# Patient Record
Sex: MALE | Race: WHITE | Employment: STUDENT | URBAN - METROPOLITAN AREA
[De-identification: names, ages, dates, MRNs, and addresses within clinical notes are randomized per-mention and may not be internally consistent; named-entity substitution may affect disease eponyms.]

---

## 2017-03-15 ENCOUNTER — OFFICE VISIT (OUTPATIENT)
Dept: INTERNAL MEDICINE CLINIC | Facility: CLINIC | Age: 22
End: 2017-03-15

## 2017-03-15 VITALS
TEMPERATURE: 97 F | OXYGEN SATURATION: 97 % | BODY MASS INDEX: 29.7 KG/M2 | WEIGHT: 196 LBS | SYSTOLIC BLOOD PRESSURE: 110 MMHG | HEART RATE: 68 BPM | DIASTOLIC BLOOD PRESSURE: 70 MMHG | RESPIRATION RATE: 16 BRPM | HEIGHT: 68 IN

## 2017-03-15 DIAGNOSIS — R82.90 URINE ABNORMALITY: Primary | ICD-10-CM

## 2017-03-15 PROCEDURE — 87086 URINE CULTURE/COLONY COUNT: CPT | Performed by: STUDENT IN AN ORGANIZED HEALTH CARE EDUCATION/TRAINING PROGRAM

## 2017-03-15 PROCEDURE — 99203 OFFICE O/P NEW LOW 30 MIN: CPT | Performed by: STUDENT IN AN ORGANIZED HEALTH CARE EDUCATION/TRAINING PROGRAM

## 2017-03-15 NOTE — PROGRESS NOTES
[facility]    REASON FOR VISIT:    Current Complaints: Patient presents with:  Urinary: Bubbly,       HPI:  Tor Afia. is a 25year old male who presents for the evaluation of some urinary symptoms. He is a new patient to the office.   P as noted   Head: Normocephalic and atraumatic. Nose: Nose normal.   Eyes: EOM are normal. Pupils are equal, round, and reactive to light. No scleral icterus. Neck: Normal range of motion. No thyromegaly present.    Cardiovascular: Normal rate, regular r

## 2017-04-10 ENCOUNTER — OFFICE VISIT (OUTPATIENT)
Dept: INTERNAL MEDICINE CLINIC | Facility: CLINIC | Age: 22
End: 2017-04-10

## 2017-04-10 VITALS
HEART RATE: 74 BPM | SYSTOLIC BLOOD PRESSURE: 120 MMHG | RESPIRATION RATE: 16 BRPM | BODY MASS INDEX: 29.86 KG/M2 | HEIGHT: 68 IN | DIASTOLIC BLOOD PRESSURE: 80 MMHG | WEIGHT: 197 LBS | OXYGEN SATURATION: 98 % | TEMPERATURE: 99 F

## 2017-04-10 DIAGNOSIS — K60.4 PERIRECTAL FISTULA: Primary | ICD-10-CM

## 2017-04-10 PROCEDURE — 99213 OFFICE O/P EST LOW 20 MIN: CPT | Performed by: INTERNAL MEDICINE

## 2017-04-11 NOTE — PROGRESS NOTES
HPI:    Patient ID: Summer Hernández. is a 25year old male. Abscess  This is a recurrent problem. Episode onset: over last few years. The problem occurs intermittently. The problem has been waxing and waning.  Pertinent negatives include no abdominal

## 2017-04-18 ENCOUNTER — OFFICE VISIT (OUTPATIENT)
Dept: SURGERY | Facility: CLINIC | Age: 22
End: 2017-04-18

## 2017-04-18 VITALS
DIASTOLIC BLOOD PRESSURE: 75 MMHG | HEIGHT: 68 IN | HEART RATE: 62 BPM | TEMPERATURE: 98 F | WEIGHT: 197.38 LBS | BODY MASS INDEX: 29.91 KG/M2 | SYSTOLIC BLOOD PRESSURE: 127 MMHG | OXYGEN SATURATION: 99 % | RESPIRATION RATE: 16 BRPM

## 2017-04-18 DIAGNOSIS — L05.91 PILONIDAL CYST: Primary | ICD-10-CM

## 2017-04-18 PROCEDURE — 46600 DIAGNOSTIC ANOSCOPY SPX: CPT | Performed by: SURGERY

## 2017-04-18 PROCEDURE — 99243 OFF/OP CNSLTJ NEW/EST LOW 30: CPT | Performed by: SURGERY

## 2017-04-18 NOTE — H&P
New Patient Visit Note       Active Problems      1.  Pilonidal cyst        Chief Complaint   Patient presents with:  Consult: NP referred by Dr Nhi Junior for fozia rectal abcess       History of Present Illness     The patient presents at the request of his prim Negative for fever, chills, diaphoresis, fatigue and unexpected weight change. HENT: Negative for hearing loss, nosebleeds, sore throat and trouble swallowing. Respiratory: Negative for apnea, cough, shortness of breath and wheezing.     Cardiovascular Posterior Fissure  No Pilonidal Cyst    See Procedures:  Anoscopy reveals normal anal rectum with no fissures or fistula lesions or other mucosal-based abnormalities. Musculoskeletal: Normal range of motion.    Neurological: He is alert and oriented to pe

## 2017-06-02 PROCEDURE — 88304 TISSUE EXAM BY PATHOLOGIST: CPT | Performed by: SURGERY

## 2017-06-08 ENCOUNTER — OFFICE VISIT (OUTPATIENT)
Dept: SURGERY | Facility: CLINIC | Age: 22
End: 2017-06-08

## 2017-06-08 VITALS
DIASTOLIC BLOOD PRESSURE: 74 MMHG | HEIGHT: 68 IN | BODY MASS INDEX: 28.79 KG/M2 | SYSTOLIC BLOOD PRESSURE: 109 MMHG | HEART RATE: 94 BPM | WEIGHT: 190 LBS | TEMPERATURE: 99 F

## 2017-06-08 DIAGNOSIS — L05.91 PILONIDAL CYST: Primary | ICD-10-CM

## 2017-06-08 PROCEDURE — 99024 POSTOP FOLLOW-UP VISIT: CPT | Performed by: PHYSICIAN ASSISTANT

## 2017-06-15 ENCOUNTER — OFFICE VISIT (OUTPATIENT)
Dept: SURGERY | Facility: CLINIC | Age: 22
End: 2017-06-15

## 2017-06-15 VITALS
BODY MASS INDEX: 28.34 KG/M2 | HEIGHT: 68 IN | DIASTOLIC BLOOD PRESSURE: 65 MMHG | RESPIRATION RATE: 14 BRPM | SYSTOLIC BLOOD PRESSURE: 110 MMHG | WEIGHT: 187 LBS | TEMPERATURE: 100 F | HEART RATE: 62 BPM

## 2017-06-15 DIAGNOSIS — L05.91 PILONIDAL CYST: Primary | ICD-10-CM

## 2017-06-15 PROCEDURE — 99024 POSTOP FOLLOW-UP VISIT: CPT | Performed by: SURGERY

## 2017-06-15 NOTE — PROGRESS NOTES
Post Operative Visit Note       Active Problems  1. Pilonidal cyst         Chief Complaint   Patient presents with:  Pilonidal Cyst: 2nd Post op follow pilonidal cyst. Surgery 6/2/2017. c/c unable to lean back slight discomfort.          History of Present I & Dressings (GAUZE DRESSING) 4\"X4\" Does not apply Pads Moisten gauze with saline and place into wound twice daily. Cover with dry gauze and one piece of paper tape.  Disp: 50 each Rfl: 5   HYDROcodone-acetaminophen 7.5-325 MG/15ML Oral Solution Take by mo visit.  ·   · The patient will return to my attention at regular intervals to monitor wound healing progress. ·   · The patient is encouraged to continue seeing the primary care physician for ongoing medical needs.   ·   · The patient was given ample oppor

## 2017-06-21 ENCOUNTER — TELEPHONE (OUTPATIENT)
Dept: SURGERY | Facility: CLINIC | Age: 22
End: 2017-06-21

## 2017-06-21 NOTE — TELEPHONE ENCOUNTER
Pt had pilonidal cystectomy 6/2 , off Norco since 6/15 and now having diarrhea and a little nausea. Pt denies, fever, chills or drainage from wound. Explained that this is probably not related to the surgery and just a stomach virus.  Instructed mom to call

## 2017-06-29 ENCOUNTER — OFFICE VISIT (OUTPATIENT)
Dept: SURGERY | Facility: CLINIC | Age: 22
End: 2017-06-29

## 2017-06-29 VITALS
RESPIRATION RATE: 16 BRPM | WEIGHT: 184 LBS | BODY MASS INDEX: 27.89 KG/M2 | HEART RATE: 61 BPM | HEIGHT: 68 IN | SYSTOLIC BLOOD PRESSURE: 112 MMHG | TEMPERATURE: 98 F | DIASTOLIC BLOOD PRESSURE: 68 MMHG

## 2017-06-29 DIAGNOSIS — L05.91 PILONIDAL CYST: Primary | ICD-10-CM

## 2017-06-29 PROCEDURE — 99024 POSTOP FOLLOW-UP VISIT: CPT | Performed by: SURGERY

## 2017-06-29 NOTE — PROGRESS NOTES
Follow Up Visit Note       Active Problems      1. Pilonidal cyst          Chief Complaint   Patient presents with:  Pilonidal Cyst: s/p PILONIDAL CYSTECTOMY ON 6/2. PT IN FOR FURTHER CARE AND TREATMENT.  PT has questions about hair removal around wound- de Systems  The Review of Systems has been reviewed by me during today. Review of Systems   Constitutional: Negative for chills, diaphoresis, fatigue, fever and unexpected weight change.    HENT: Negative for hearing loss, nosebleeds, sore throat and trouble in detail and to the patient's satisfaction. The patient voiced understanding of the postoperative care plan. ·   ·        No orders of the defined types were placed in this encounter.       Imaging & Referrals   None    Follow Up  Return in about 4 weeks

## 2017-07-06 ENCOUNTER — OFFICE VISIT (OUTPATIENT)
Dept: INTERNAL MEDICINE CLINIC | Facility: CLINIC | Age: 22
End: 2017-07-06

## 2017-07-06 ENCOUNTER — HOSPITAL ENCOUNTER (OUTPATIENT)
Dept: ULTRASOUND IMAGING | Age: 22
Discharge: HOME | End: 2017-07-06
Attending: INTERNAL MEDICINE
Payer: COMMERCIAL

## 2017-07-06 VITALS
OXYGEN SATURATION: 98 % | HEART RATE: 71 BPM | BODY MASS INDEX: 28.19 KG/M2 | SYSTOLIC BLOOD PRESSURE: 110 MMHG | RESPIRATION RATE: 16 BRPM | DIASTOLIC BLOOD PRESSURE: 68 MMHG | HEIGHT: 68 IN | TEMPERATURE: 99 F | WEIGHT: 186 LBS

## 2017-07-06 DIAGNOSIS — R10.31 INGUINAL PAIN, RIGHT: ICD-10-CM

## 2017-07-06 DIAGNOSIS — R30.0 DYSURIA: Primary | ICD-10-CM

## 2017-07-06 LAB
APPEARANCE: CLEAR
BILIRUBIN: NEGATIVE
GLUCOSE (URINE DIPSTICK): NEGATIVE MG/DL
KETONES (URINE DIPSTICK): NEGATIVE MG/DL
LEUKOCYTES: NEGATIVE
MULTISTIX LOT#: NORMAL NUMERIC
NITRITE, URINE: NEGATIVE
OCCULT BLOOD: NEGATIVE
PH, URINE: 7 (ref 4.5–8)
PROTEIN (URINE DIPSTICK): NEGATIVE MG/DL
SPECIFIC GRAVITY: 1.02 (ref 1–1.03)
URINE-COLOR: YELLOW
UROBILINOGEN,SEMI-QN: 0.2 MG/DL (ref 0–1.9)

## 2017-07-06 PROCEDURE — 76770 US EXAM ABDO BACK WALL COMP: CPT | Performed by: INTERNAL MEDICINE

## 2017-07-06 PROCEDURE — 81003 URINALYSIS AUTO W/O SCOPE: CPT | Performed by: INTERNAL MEDICINE

## 2017-07-06 PROCEDURE — 99214 OFFICE O/P EST MOD 30 MIN: CPT | Performed by: INTERNAL MEDICINE

## 2017-07-06 RX ORDER — CIPROFLOXACIN 500 MG/1
500 TABLET, FILM COATED ORAL 2 TIMES DAILY
Qty: 14 TABLET | Refills: 0 | Status: SHIPPED | OUTPATIENT
Start: 2017-07-06 | End: 2017-07-25 | Stop reason: ALTCHOICE

## 2017-07-06 NOTE — PATIENT INSTRUCTIONS
Kidney Stone (with Pain)    The sharp cramping pain on either side of your lower back and nausea/vomiting that you have are because of a small stone that has formed in the kidney. It is now passing down a narrow tube (ureter) on its way to your bladder. · Each time you urinate, do so in a jar. Pour the urine from the jar through the strainer and into the toilet. Continue doing this until 24 hours after your pain stops. By then, if there was a kidney stone, it should pass from your bladder.  Some stones dis · Eat foods that contain phytates. These include wheat, rice, rye, barley, and beans. Phytates are substances that may lower your risk for any type of stone to form. · Eat more fruits and vegetables. Choose those that are high in potassium.   · Eat foods h Follow up with your healthcare provider, or as advised, if the pain lasts more than 48 hours. Talk with your provider about urine and blood tests to find out the cause of your stone.  If you had an X-ray, CT scan, or other diagnostic test, you will be told

## 2017-07-06 NOTE — PROGRESS NOTES
HPI:    Patient ID: Rock Ganser. is a 25year old male. Dysuria    This is a new problem. Episode onset: 1 week. The problem occurs every urination. The problem has been unchanged. The quality of the pain is described as burning.  The pain is at distress. Neck: Neck supple. Cardiovascular: Normal rate, regular rhythm and normal heart sounds. No murmur heard. Pulmonary/Chest: Effort normal and breath sounds normal. No respiratory distress. He has no wheezes. He has no rales.    Abdominal: So

## 2017-07-11 ENCOUNTER — OFFICE VISIT (OUTPATIENT)
Dept: SURGERY | Facility: CLINIC | Age: 22
End: 2017-07-11

## 2017-07-11 VITALS
SYSTOLIC BLOOD PRESSURE: 115 MMHG | HEART RATE: 66 BPM | WEIGHT: 186 LBS | BODY MASS INDEX: 28.19 KG/M2 | DIASTOLIC BLOOD PRESSURE: 67 MMHG | TEMPERATURE: 99 F | HEIGHT: 68 IN

## 2017-07-11 DIAGNOSIS — L05.91 PILONIDAL CYST: Primary | ICD-10-CM

## 2017-07-11 PROCEDURE — 99024 POSTOP FOLLOW-UP VISIT: CPT | Performed by: PHYSICIAN ASSISTANT

## 2017-07-11 NOTE — PROGRESS NOTES
Follow Up Visit Note       Active Problems      1. Pilonidal cyst          Chief Complaint   Patient presents with:  Pilonidal Cyst: S/P PILONIDAL CYSTECTOMY ON 6/2 WITH PBP. C/O discomfort.        History of Present Illness  The patient is a pleasant 22-ye times daily. Disp: 14 tablet Rfl: 0   acetaminophen 500 MG Oral Tab Take 500 mg by mouth every 6 (six) hours as needed for Pain. Disp:  Rfl:    Soft Lens Products (SALINE) 0.9 % Does not apply Solution Moisten gauze and place into wound two times daily.  Patrick Lin Neck: Normal range of motion. Cardiovascular: Normal rate and regular rhythm. Pulmonary/Chest: Effort normal. No respiratory distress. Abdominal: Soft. Bowel sounds are normal. He exhibits no distension. Musculoskeletal: Normal range of motion. defined types were placed in this encounter. Imaging & Referrals   None    Follow Up  Return in 3 weeks (on 8/1/2017) for pilonidal wound check.     TRI Weiss

## 2017-07-25 PROBLEM — L05.91 PILONIDAL CYST: Status: RESOLVED | Noted: 2017-04-18 | Resolved: 2017-07-25

## 2017-07-25 NOTE — PROGRESS NOTES
HPI:    Patient ID: Navya Shipman. is a 25year old male. Anxiety   This is a chronic problem. The current episode started more than 1 year ago. The problem occurs constantly. The problem has been rapidly worsening.  Associated symptoms include fat sounds. No murmur heard. Pulmonary/Chest: Effort normal and breath sounds normal. No respiratory distress. He has no wheezes. He has no rales. Abdominal: Soft. Bowel sounds are normal. He exhibits no distension. There is no tenderness.  There is no re

## 2017-08-01 ENCOUNTER — OFFICE VISIT (OUTPATIENT)
Dept: SURGERY | Facility: CLINIC | Age: 22
End: 2017-08-01

## 2017-08-01 VITALS
WEIGHT: 186 LBS | BODY MASS INDEX: 28.19 KG/M2 | HEART RATE: 62 BPM | SYSTOLIC BLOOD PRESSURE: 116 MMHG | HEIGHT: 68 IN | DIASTOLIC BLOOD PRESSURE: 72 MMHG

## 2017-08-01 DIAGNOSIS — L05.91 PILONIDAL CYST: Primary | ICD-10-CM

## 2017-08-01 PROCEDURE — 99024 POSTOP FOLLOW-UP VISIT: CPT | Performed by: PHYSICIAN ASSISTANT

## 2017-08-01 NOTE — PROGRESS NOTES
Follow Up Visit Note       Active Problems      1. Pilonidal cyst          Chief Complaint   Patient presents with: Follow - Up: est. pt in for 3 week further care and treatment for Pilonidal cystectomy on 6/2 with PBP.        History of Present Illness  T Prescriptions:  escitalopram 10 MG Oral Tab Take 1 tablet (10 mg total) by mouth daily. Disp: 30 tablet Rfl: 0        Review of Systems  The Review of Systems has been reviewed by me during today.   Review of Systems   Constitutional: Negative for chills, d well, small amount of granulation tissue remains exposed in a 2 inch vertical line within the gluteal cleft. Hair around the wound is shaved at today's visit. Psychiatric: He has a normal mood and affect.  His behavior is normal. Judgment and thought cont

## 2017-08-14 PROBLEM — F41.1 GENERALIZED ANXIETY DISORDER: Status: ACTIVE | Noted: 2017-08-14

## 2017-08-14 NOTE — PROGRESS NOTES
Kalen Ericka. is a 25year old male. HPI:   Pt presents for follow up of anxiety    Last month was switched from sertraline to lexapro. Pt reports improvement in anxiety but still prone to feeling anxious, excess worry, and some low mood.  Denies pa (primary encounter diagnosis)     Increase lexapro to 20mg daily. Discussed medication indications, risks, alternatives and side effects. The patient indicates understanding of these issues and agrees to the plan.   The patient is asked to return in 8 wk

## 2017-08-17 ENCOUNTER — OFFICE VISIT (OUTPATIENT)
Dept: SURGERY | Facility: CLINIC | Age: 22
End: 2017-08-17

## 2017-08-17 VITALS
SYSTOLIC BLOOD PRESSURE: 99 MMHG | DIASTOLIC BLOOD PRESSURE: 62 MMHG | TEMPERATURE: 98 F | BODY MASS INDEX: 28.59 KG/M2 | HEIGHT: 68 IN | HEART RATE: 67 BPM | WEIGHT: 188.63 LBS

## 2017-08-17 DIAGNOSIS — L05.91 PILONIDAL CYST: Primary | ICD-10-CM

## 2017-08-17 PROCEDURE — 99024 POSTOP FOLLOW-UP VISIT: CPT | Performed by: PHYSICIAN ASSISTANT

## 2017-08-17 NOTE — PROGRESS NOTES
Follow Up Visit Note       Active Problems      1. Pilonidal cyst          Chief Complaint   Patient presents with:  Pilonidal Cyst: Est. pt in for 3 week further care and treatment for Pilonidal cystectomy on 6/2 with PBP.  Pt denies any bleeding discharge Review of Systems has been reviewed by me during today. Review of Systems   Constitutional: Negative for chills, diaphoresis, fatigue, fever and unexpected weight change. HENT: Negative for hearing loss, nosebleeds, sore throat and trouble swallowing. vitals reviewed. Assessment   Pilonidal cyst  (primary encounter diagnosis)    Plan   The patient continues to do well following pilonidal cystectomy.   At today's office visit I discussed with the patient and his father that there is a very small

## 2017-10-06 NOTE — PROGRESS NOTES
Mario Gray. is a 25year old male. HPI:   Pt following up on anxiety. Feels significant improvement with increase in lexapro from 10 to 20mg daily. Denies  Side effects    C/o lump on left wrist, comes and goes, noticed x 1 month.  Denies trauma, Generalized anxiety disorder  (primary encounter diagnosis)  Ganglion cyst of volar aspect of left wrist  Influenza vaccination declined      -HOLLY well controlled on lexapro cpm  -declines flu shot  -educated on ganglion cyst. Referred to ortho, establis

## 2017-10-06 NOTE — PATIENT INSTRUCTIONS
Continue lexapro daily  If ganglion cyst becomes painful or bothersome, schedule with Dr. Kelsy Elliott

## 2018-01-12 ENCOUNTER — OFFICE VISIT (OUTPATIENT)
Dept: INTERNAL MEDICINE CLINIC | Facility: CLINIC | Age: 23
End: 2018-01-12

## 2018-01-12 VITALS
TEMPERATURE: 98 F | RESPIRATION RATE: 16 BRPM | OXYGEN SATURATION: 96 % | DIASTOLIC BLOOD PRESSURE: 68 MMHG | HEIGHT: 68 IN | BODY MASS INDEX: 27.58 KG/M2 | SYSTOLIC BLOOD PRESSURE: 118 MMHG | HEART RATE: 82 BPM | WEIGHT: 182 LBS

## 2018-01-12 DIAGNOSIS — F41.8 SITUATIONAL ANXIETY: ICD-10-CM

## 2018-01-12 DIAGNOSIS — F41.1 GENERALIZED ANXIETY DISORDER: ICD-10-CM

## 2018-01-12 DIAGNOSIS — J02.0 STREP THROAT: ICD-10-CM

## 2018-01-12 DIAGNOSIS — J02.9 SORE THROAT: Primary | ICD-10-CM

## 2018-01-12 LAB
CONTROL LINE PRESENT WITH A CLEAR BACKGROUND (YES/NO): YES YES/NO
STREP GRP A CUL-SCR: POSITIVE

## 2018-01-12 PROCEDURE — 99214 OFFICE O/P EST MOD 30 MIN: CPT | Performed by: PHYSICIAN ASSISTANT

## 2018-01-12 PROCEDURE — 87880 STREP A ASSAY W/OPTIC: CPT | Performed by: PHYSICIAN ASSISTANT

## 2018-01-12 RX ORDER — ESCITALOPRAM OXALATE 20 MG/1
20 TABLET ORAL DAILY
Qty: 90 TABLET | Refills: 1 | Status: SHIPPED | OUTPATIENT
Start: 2018-01-12 | End: 2018-01-18

## 2018-01-12 RX ORDER — AZITHROMYCIN 250 MG/1
TABLET, FILM COATED ORAL
Qty: 6 TABLET | Refills: 0 | Status: SHIPPED | OUTPATIENT
Start: 2018-01-12 | End: 2018-08-06 | Stop reason: ALTCHOICE

## 2018-01-12 RX ORDER — PROPRANOLOL HYDROCHLORIDE 20 MG/5ML
20 SOLUTION ORAL
Qty: 250 ML | Refills: 0 | Status: SHIPPED | OUTPATIENT
Start: 2018-01-12 | End: 2019-05-15

## 2018-01-12 NOTE — PROGRESS NOTES
Arlen Schumacher. is a 25year old male. HPI:   Pt following up on lexapro for HOLLY. States he's doing well. X 2 wks complains of anxiety around social situations including palpitations, racing heart, excess worry ahead of time.  Worried d/t some group p tonsillar hypertrophy and enlargement +2 b/l without exudates or abscess. Voice normal  NECK: supple,  no thyromegaly.  + tender 2cm lymph node right subtonsillar area that is mobile without surrounding erythema, no other lymph node involvement, parotid gla

## 2018-01-12 NOTE — PATIENT INSTRUCTIONS
Continue lexapro  Take propranolol as needed, 30 minutes prior to event as needed  Monitor the enlarged lymph node and start flonase over-the-counter, 2 sprays to each nostril every night. Drink plenty of fluid.  Call if any worsening symptoms, or if lymph

## 2018-01-18 ENCOUNTER — TELEPHONE (OUTPATIENT)
Dept: INTERNAL MEDICINE CLINIC | Facility: CLINIC | Age: 23
End: 2018-01-18

## 2018-01-18 DIAGNOSIS — F41.1 GENERALIZED ANXIETY DISORDER: ICD-10-CM

## 2018-01-18 RX ORDER — ESCITALOPRAM OXALATE 20 MG/1
20 TABLET ORAL DAILY
Qty: 90 TABLET | Refills: 1 | Status: SHIPPED | OUTPATIENT
Start: 2018-01-18 | End: 2018-08-06

## 2018-01-18 NOTE — TELEPHONE ENCOUNTER
Patient's mother called and stated generic Lexapro cannot be sent to pharmacy, has to go through mail Order. Please send to [facility] as listed.

## 2018-02-26 ENCOUNTER — TELEPHONE (OUTPATIENT)
Dept: INTERNAL MEDICINE CLINIC | Facility: CLINIC | Age: 23
End: 2018-02-26

## 2018-02-26 DIAGNOSIS — J11.1 FLU: Primary | ICD-10-CM

## 2018-02-26 RX ORDER — OSELTAMIVIR PHOSPHATE 75 MG/1
75 CAPSULE ORAL 2 TIMES DAILY
Qty: 10 CAPSULE | Refills: 0 | Status: SHIPPED | OUTPATIENT
Start: 2018-02-26 | End: 2018-08-06 | Stop reason: ALTCHOICE

## 2018-02-26 NOTE — TELEPHONE ENCOUNTER
Per Dr. Bella Colon, patient c/o fevers, muscle aches and headaches. She recommends Tamiflu, rest and follow up should symptoms persist.     Rx sent to pharmacy and mother aware per Dr. Bella Colon.

## 2018-08-06 PROBLEM — L05.91 PILONIDAL CYST: Status: RESOLVED | Noted: 2017-04-18 | Resolved: 2018-08-06

## 2018-08-06 NOTE — PROGRESS NOTES
Negro Pruitt. is a 21year old male. HPI:   Pt here for med check  On lexapro daily for anxiety and propranolol prn. Since last visit states he has felt somewhat fatigued, unsure if this is due to low mood.  Tried switching lexapro to pm dosing angel cough or wheezing  CARDIOVASCULAR: denies chest pain or palpitations, denies leg swelling  GI: denies abdominal pain and denies heartburn.  Denies nausea, vomiting, diarrhea, constipation  NEURO: denies headaches, dizziness, weakness, syncope  PSYCH: denies understanding of these issues and agrees to the plan. The patient is asked to return in Return in about 6 weeks (around 9/17/2018).  Cecilia Harvey

## 2018-08-06 NOTE — PATIENT INSTRUCTIONS
Take naproxen twice daily with meals for back pain. Start stretching as follows. Continue lexapro at night.  Start wellbutrin once daily in the morning

## 2018-09-22 NOTE — PROGRESS NOTES
Cassandra Lopes. is a 21year old male. HPI:   Pt following up on dysthymia, anxiety  Doing well  States he is taking wellbutrin as directed, only consistent x 2 weeks but feels energy and motivation have begun to improve.  Denies side effects  Stable joint swelling or bony tenderness  NEURO: a/ox3, no focal deficits  PSYCH: mood and affect normal    ASSESSMENT AND PLAN:   Generalized anxiety disorder  (primary encounter diagnosis)  Dysthymia    Improved; continue current therapy with wellbutrin and rebecca

## 2018-10-05 DIAGNOSIS — F41.1 GENERALIZED ANXIETY DISORDER: ICD-10-CM

## 2018-10-08 RX ORDER — ESCITALOPRAM OXALATE 20 MG/1
TABLET ORAL
Qty: 90 TABLET | Refills: 0 | OUTPATIENT
Start: 2018-10-08

## 2019-01-04 ENCOUNTER — HOSPITAL ENCOUNTER (OUTPATIENT)
Age: 24
Discharge: HOME | End: 2019-01-04
Payer: COMMERCIAL

## 2019-01-04 VITALS
HEART RATE: 82 BPM | SYSTOLIC BLOOD PRESSURE: 126 MMHG | OXYGEN SATURATION: 100 % | RESPIRATION RATE: 19 BRPM | TEMPERATURE: 98 F | DIASTOLIC BLOOD PRESSURE: 69 MMHG

## 2019-01-04 DIAGNOSIS — J02.0 STREPTOCOCCUS PHARYNGITIS: Primary | ICD-10-CM

## 2019-01-04 DIAGNOSIS — J06.9 UPPER RESPIRATORY TRACT INFECTION, UNSPECIFIED TYPE: ICD-10-CM

## 2019-01-04 LAB — POCT RAPID STREP: POSITIVE

## 2019-01-04 PROCEDURE — 99213 OFFICE O/P EST LOW 20 MIN: CPT

## 2019-01-04 PROCEDURE — 87430 STREP A AG IA: CPT | Performed by: PHYSICIAN ASSISTANT

## 2019-01-04 PROCEDURE — 99204 OFFICE O/P NEW MOD 45 MIN: CPT

## 2019-01-04 RX ORDER — FLUTICASONE PROPIONATE 50 MCG
2 SPRAY, SUSPENSION (ML) NASAL DAILY
Qty: 16 G | Refills: 0 | Status: SHIPPED | OUTPATIENT
Start: 2019-01-04 | End: 2019-02-03

## 2019-01-04 RX ORDER — PREDNISOLONE SODIUM PHOSPHATE 15 MG/5ML
30 SOLUTION ORAL DAILY
Qty: 50 ML | Refills: 0 | Status: SHIPPED | OUTPATIENT
Start: 2019-01-04 | End: 2019-01-09

## 2019-01-04 RX ORDER — AZITHROMYCIN 200 MG/5ML
500 POWDER, FOR SUSPENSION ORAL DAILY
Qty: 65 ML | Refills: 0 | Status: SHIPPED | OUTPATIENT
Start: 2019-01-04 | End: 2019-01-09

## 2019-01-04 NOTE — ED PROVIDER NOTES
Patient Seen in: [address] [facility]    History   Patient presents with:  Cough/URI    Stated Complaint: cough,congestion    HPI    Patient is a 45-year-old male. Patient has been sick with URI type symptoms for nearly 2 weeks.   Symptom moderately injected. There are a few scattered petechiae. Auditory canals are benign. Lung: No distress, RR, no retraction, breath sounds are clear bilaterally. frequent dry cough.   Cardio: Regular rate and rhythm, normal S1-S2, no murmur appreciable  E Nasal route daily.   Qty: 16 g Refills: 0

## 2019-05-15 NOTE — PATIENT INSTRUCTIONS
Have labs drawn, fasting 8-10 hours prior (water before is ok).    Continue lexapro and healthy lifestyle

## 2019-05-15 NOTE — PROGRESS NOTES
Karolineflacokath Pruitt. is a 25year old male. HPI:   Pt here for med check on lexapro. D/c'd wellbutrin since last visit d/t improved depression. Feeling well  Finished school and has new job in [employer], moving next month. No concerns today.        Current Outpat (LEXAPRO) 20 MG Oral Tab 90 tablet 1     Sig: Take 1 tablet (20 mg total) by mouth daily. The patient indicates understanding of these issues and agrees to the plan.   The patient is asked to return in Return in about 6 months (around 11/15/2019) fo

## 2020-03-02 ENCOUNTER — TELEPHONE (OUTPATIENT)
Dept: INTERNAL MEDICINE CLINIC | Facility: CLINIC | Age: 25
End: 2020-03-02

## 2020-03-02 DIAGNOSIS — F41.1 GENERALIZED ANXIETY DISORDER: ICD-10-CM

## 2020-03-02 RX ORDER — ESCITALOPRAM OXALATE 20 MG/1
20 TABLET ORAL DAILY
Qty: 90 TABLET | Refills: 0 | Status: SHIPPED | OUTPATIENT
Start: 2020-03-02 | End: 2020-05-26

## 2020-03-02 NOTE — TELEPHONE ENCOUNTER
Pt's mom called and stated that the pt had moved out of town and they talked to Richelle Peck and she said she would refill his script 1 more time if she gave us the pharmacy, [address], fax #[phone]

## 2020-03-02 NOTE — TELEPHONE ENCOUNTER
Last time medication was refilled 5/2019   Quantity  and number of refills 90 with 1 refill    Last office visit 5/2019   Next office visit:   [place] of state.  Last refill before discontinuation of medication support

## 2020-05-25 DIAGNOSIS — F41.1 GENERALIZED ANXIETY DISORDER: ICD-10-CM

## 2020-05-26 RX ORDER — ESCITALOPRAM OXALATE 20 MG/1
TABLET ORAL
Qty: 30 TABLET | Refills: 0 | Status: SHIPPED | OUTPATIENT
Start: 2020-05-26

## 2020-06-18 DIAGNOSIS — F41.1 GENERALIZED ANXIETY DISORDER: ICD-10-CM

## 2020-06-18 NOTE — TELEPHONE ENCOUNTER
Left to call the office regarding his refill request. Has pt est with another provider? Last time medication was refilled 5/2020  Quantity  and number of refills 30    Last office visit 5/2019   Next office visit: NONE  Pt has moved out of state.  Last refill before discontinuation of medication support

## 2020-06-19 RX ORDER — ESCITALOPRAM OXALATE 20 MG/1
TABLET ORAL
Qty: 30 TABLET | Refills: 0 | OUTPATIENT
Start: 2020-06-19

## (undated) NOTE — Clinical Note
2017    Patient: Francisco Alcala. : 3/8/1995 Visit date: 2017    Dear  Dr. Charlene Vásquez MD,    Thank you for referring Francisco Alcala. to my practice. Please find my assessment and plan below.            Assessment   Pilonidal cyst  (p

## (undated) NOTE — Clinical Note
06/15/2017    Patient: Lester Gracia. : 3/8/1995 Visit date: 6/15/2017    Dear  Dr. Mason Cabrales MD,    Thank you for referring Lester Gracia. to my practice. Please find my assessment and plan below.         Assessment   Pilonidal cyst  (prim

## (undated) NOTE — LETTER
17    Patient: Cassandra Lopes. : 3/8/1995 Visit date: 2017    Dear  Dr. Jose Miguel Page MD,    Thank you for referring Cassandra Lopes. to my practice. Please find my assessment and plan below.            Assessment   Pilonidal cyst  (carmine

## (undated) NOTE — LETTER
17    Patient: Negro Pruitt. : 3/8/1995 Visit date: 2017    Dear  Dr. Alexy Rodriguez MD,    Thank you for referring Negro Hull to my practice. Please find my assessment and plan below.            Assessment   Pilonidal cyst  (primary

## (undated) NOTE — MR AVS SNAPSHOT
[address]  [address] [id number] [phone]               Thank you for choosing us for your health care visit with Emerita Flynn MD.  We are glad to serve you and happy to provide you with this s your Zip Code and Date of Birth to complete the sign-up process. If you do not sign up before the expiration date, you must request a new code.     Your unique [employer] Access Code: [email or web address]  Expires: 5/14/2017  4:35 PM    If you have questions, you can c

## (undated) NOTE — LETTER
17    Patient: Stephen Mazariegos. : 3/8/1995 Visit date: 2017    Dear  Dr. Slade Roberts MD,    Thank you for referring Stephen MazariegosBridget to my practice. Please find my assessment and plan below.           I performed a wet-to-dry dressing ferrara

## (undated) NOTE — MR AVS SNAPSHOT
[facility]  [address] [address] [phone]               Thank you for choosing us for your health care visit with Charley Alves MD.  We are glad to serve you and happy to provide you with this summary of Take 15 mL by mouth every 6 (six) hours as needed for Pain. Commonly known as:  LORTAB           Saline 0.9 % Soln   Moisten gauze and place into wound two times daily. * Notice:   This list has 2 medication(s) that are the same as other medicat

## (undated) NOTE — MR AVS SNAPSHOT
[address]  [address] [address] [phone]               Thank you for choosing us for your health care visit with Markos Ko MD.  We are glad to serve you and happy to provide you with this feldman schedule your appointment. Failure to obtain required authorization numbers can create reimbursement difficulties for you.     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 Home exercises work great.  Put more priority on exe